# Patient Record
Sex: MALE | Race: WHITE
[De-identification: names, ages, dates, MRNs, and addresses within clinical notes are randomized per-mention and may not be internally consistent; named-entity substitution may affect disease eponyms.]

---

## 2018-01-05 ENCOUNTER — HOSPITAL ENCOUNTER (EMERGENCY)
Dept: HOSPITAL 41 - JD.ED | Age: 38
Discharge: HOME | End: 2018-01-05
Payer: COMMERCIAL

## 2018-01-05 DIAGNOSIS — Z88.1: ICD-10-CM

## 2018-01-05 DIAGNOSIS — F17.210: ICD-10-CM

## 2018-01-05 DIAGNOSIS — R09.1: Primary | ICD-10-CM

## 2018-01-05 PROCEDURE — 86140 C-REACTIVE PROTEIN: CPT

## 2018-01-05 PROCEDURE — 85025 COMPLETE CBC W/AUTO DIFF WBC: CPT

## 2018-01-05 PROCEDURE — 84484 ASSAY OF TROPONIN QUANT: CPT

## 2018-01-05 PROCEDURE — 71046 X-RAY EXAM CHEST 2 VIEWS: CPT

## 2018-01-05 PROCEDURE — 36415 COLL VENOUS BLD VENIPUNCTURE: CPT

## 2018-01-05 PROCEDURE — 85379 FIBRIN DEGRADATION QUANT: CPT

## 2018-01-05 PROCEDURE — 99284 EMERGENCY DEPT VISIT MOD MDM: CPT

## 2018-01-05 PROCEDURE — 83690 ASSAY OF LIPASE: CPT

## 2018-01-05 PROCEDURE — 80053 COMPREHEN METABOLIC PANEL: CPT

## 2018-01-05 PROCEDURE — 93005 ELECTROCARDIOGRAM TRACING: CPT

## 2018-01-05 NOTE — EDM.PDOC
ED HPI GENERAL MEDICAL PROBLEM





- General


Chief Complaint: Respiratory Problem


Stated Complaint: SHARP PAIN LOWER LT LUNG


Time Seen by Provider: 01/05/18 17:42


Source of Information: Reports: Patient


History Limitations: Reports: No Limitations





- History of Present Illness


INITIAL COMMENTS - FREE TEXT/NARRATIVE: 





37 -year-old male presents for evaluation treatment of left-sided chest pain. 

Patient reports he' started experiencing the chest pain on Wednesday. He states 

at first he thought it was heartburn and took some over-the-counter medication 

to help with the pain. He states that his worsen today. Prior to today it was 

intermittent but is now constant. He states that is worse with deep breathing. 

He reports it as a sharp pain and states is a 4 or 5 out of 10. He states it 

awoke him from sleep this morning around 0300. He states that he is not feel 

short of breath. He has had a slight cough. No nausea, vomiting, fevers, chills

, lightheadedness, dizziness, syncope or pain in the legs. Reports he has had a 

slight cough. 





He would denies any recent trauma to the chest.





No recent travel. 


Duration: Day(s): (2)


Treatments PTA: Reports: NSAIDS


  ** Left Chest


Pain Score (Numeric/FACES): 3





- Related Data


 Allergies











Allergy/AdvReac Type Severity Reaction Status Date / Time


 


cephalexin Allergy  Rash Verified 01/05/18 17:30











Home Meds: 


 Home Meds





. [No Known Home Meds]  01/05/18 [History]











Past Medical History


HEENT History: Reports: Impaired Vision


Other HEENT History: wears glasses





- Past Surgical History


Male  Surgical History: Reports: Vasectomy





Social & Family History





- Tobacco Use


Smoking Status *Q: Current Every Day Smoker


Years of Tobacco use: 15


Packs/Tins Daily: 1





- Caffeine Use


Caffeine Use: Reports: Soda





- Recreational Drug Use


Recreational Drug Use: No





ED ROS GENERAL





- Review of Systems


Review Of Systems: See Below


Constitutional: Denies: Fever, Chills


Respiratory: Reports: Pleuritic Chest Pain, Cough.  Denies: Shortness of Breath


Cardiovascular: Reports: Chest Pain


GI/Abdominal: Denies: Abdominal Pain, Nausea, Vomiting


Musculoskeletal: Denies: Leg Pain


Neurological: Denies: Syncope





ED EXAM, GENERAL





- Physical Exam


Exam: See Below


Exam Limited By: No Limitations


General Appearance: Alert, WD/WN, No Apparent Distress


Ears: Normal External Exam


Nose: Normal Inspection


Throat/Mouth: Normal Inspection, Normal Lips, Normal Voice, No Airway Compromise


Respiratory/Chest: No Respiratory Distress, Lungs Clear, Normal Breath Sounds


Cardiovascular: Normal Peripheral Pulses, Regular Rate, Rhythm, No Murmur


GI/Abdominal: Normal Bowel Sounds, Soft, Non-Tender


Neurological: Alert, Oriented, Normal Cognition


Psychiatric: Normal Affect, Normal Mood


Skin Exam: Warm, Dry, Normal Color





EKG INTERPRETATION


EKG Date: 01/05/18


Time: 18:30


Rhythm: NSR


Rate (Beats/Min): 65


Axis: Normal


P-Wave: Present


QRS: Normal


ST-T: Normal


QT: Normal


EKG Interpretation Comments: 





NSR at 65 bpm. No acute ST-T wave changes. Reviewed by myself and Dr. Lo. 





Course





- Vital Signs


Last Recorded V/S: 


 Last Vital Signs











Temp  36.4 C   01/05/18 17:31


 


Pulse  82   01/05/18 17:31


 


Resp  18   01/05/18 17:31


 


BP  135/89   01/05/18 17:31


 


Pulse Ox  98   01/05/18 17:31














- Orders/Labs/Meds


Orders: 


 Active Orders 24 hr











 Category Date Time Status


 


 Cardiac Monitoring [RC] .AS DIRECTED Care  01/05/18 17:53 Active


 


 EKG Documentation Completion [RC] ASDIRECTED Care  01/05/18 17:54 Active


 


 Peripheral IV Care [RC] .AS DIRECTED Care  01/05/18 17:54 Active


 


 Chest 2V [CR] Stat Exams  01/05/18 17:53 Taken


 


 Peripheral IV Insertion Adult [OM.PC] Routine Oth  01/05/18 17:53 Ordered


 


 EKG 12 Lead [EK] Stat Ther  01/05/18 17:53 Ordered











Labs: 


 Laboratory Tests











  01/05/18 01/05/18 01/05/18 Range/Units





  18:20 18:20 18:20 


 


WBC   8.92   (4.23-9.07)  K/mm3


 


RBC   5.73   (4.63-6.08)  M/mm3


 


Hgb   16.9   (13.7-17.5)  gm/L


 


Hct   50.3   (40.1-51.0)  %


 


MCV   87.8   (79.0-92.2)  fl


 


MCH   29.5   (25.7-32.2)  pg


 


MCHC   33.6   (32.2-35.5)  g/dl


 


RDW Std Deviation   42.0   (35.1-43.9)  fL


 


Plt Count   222   (163-337)  K/mm3


 


MPV   10.2   (9.4-12.3)  fl


 


Neutrophils % (Manual)   61 H   (40-60)  %


 


Band Neutrophils %   0   (0-10)  %


 


Lymphocytes % (Manual)   31   (20-40)  %


 


Atypical Lymphs %   0   %


 


Monocytes % (Manual)   6   (2-10)  %


 


Eosinophils % (Manual)   2   (0.8-7.0)  %


 


Basophils % (Manual)   0 L   (0.2-1.2)  


 


Platelet Estimate   Adequate   


 


RBC Morph Comment   Normal   


 


D-Dimer, Quantitative    0.55  (0.19-0.59)  mg/L


 


Sodium  140    (136-145)  mEq/L


 


Potassium  3.6    (3.5-5.1)  mEq/L


 


Chloride  106    ()  mEq/L


 


Carbon Dioxide  25    (21-32)  mEq/L


 


Anion Gap  12.6    (5-15)  


 


BUN  19 H    (7-18)  mg/dL


 


Creatinine  1.1    (0.7-1.3)  mg/dL


 


Est Cr Clr Drug Dosing  99.83    mL/min


 


Estimated GFR (MDRD)  > 60    (>60)  mL/min


 


BUN/Creatinine Ratio  17.3    (14-18)  


 


Glucose  94    ()  mg/dL


 


Calcium  8.9    (8.5-10.1)  mg/dL


 


Total Bilirubin  0.3    (0.2-1.0)  mg/dL


 


AST  17    (15-37)  U/L


 


ALT  42    (16-63)  U/L


 


Alkaline Phosphatase  110    ()  U/L


 


Troponin I  < 0.017    (0.00-0.056)  ng/mL


 


C-Reactive Protein  < 0.2    (<1.0)  mg/dL


 


Total Protein  7.6    (6.4-8.2)  g/dl


 


Albumin  4.3    (3.4-5.0)  g/dl


 


Globulin  3.3    gm/dL


 


Albumin/Globulin Ratio  1.3    (1-2)  


 


Lipase  329    ()  U/L











Meds: 


Medications














Discontinued Medications














Generic Name Dose Route Start Last Admin





  Trade Name Freq  PRN Reason Stop Dose Admin


 


Sodium Chloride  10 ml  01/05/18 17:53  01/05/18 18:19





  Saline Flush  FLUSH   10 ml





  ASDIRECTED PRN   Administration





  Keep Vein Open   














- Radiology Interpretation


Free Text/Narrative:: 





chest xray shows no acute intrathoracic process. 





- Re-Assessments/Exams


Free Text/Narrative Re-Assessment/Exam: 





01/05/18 19:40


I reviewed the EKG, labs and chest x-ray results with the patient.





I feel he likely has pleurisy. Recommend Tylenol, Motrin and follow-up if not 

much better next week.





Discharge instructions as documented.











Departure





- Departure


Time of Disposition: 19:41


Disposition: Home, Self-Care 01


Condition: Fair


Clinical Impression: 


 Pleurisy








- Discharge Information


Instructions:  Pleurisy, Easy-to-Read


Referrals: 


PCP,None [Primary Care Provider] - 


Forms:  ED Department Discharge


Additional Instructions: 


Over-the-counter Tylenol or Motrin as needed for pain relief.





make sure you are drinking plenty of fluids.





Follow-up with family medicine if your symptoms have not improved in one week. 

Recommend Dr. Allred or Percy Leslie at the [facility]. Call [phone] to schedule with one of them. 





Please return to the ER if your symptoms change or worsen.





- My Orders


Last 24 Hours: 


My Active Orders





01/05/18 17:53


Cardiac Monitoring [RC] .AS DIRECTED 


Chest 2V [CR] Stat 


Peripheral IV Insertion Adult [OM.PC] Routine 


EKG 12 Lead [EK] Stat 





01/05/18 17:54


EKG Documentation Completion [RC] ASDIRECTED 


Peripheral IV Care [RC] .AS DIRECTED 














- Assessment/Plan


Last 24 Hours: 


My Active Orders





01/05/18 17:53


Cardiac Monitoring [RC] .AS DIRECTED 


Chest 2V [CR] Stat 


Peripheral IV Insertion Adult [OM.PC] Routine 


EKG 12 Lead [EK] Stat 





01/05/18 17:54


EKG Documentation Completion [RC] ASDIRECTED 


Peripheral IV Care [RC] .AS DIRECTED

## 2018-01-06 NOTE — CR
Chest: Two views of the chest were obtained.

 

Comparison: No prior chest x-ray.

 

Heart size and mediastinum are within normal limits.  Lungs are clear 

but hyperinflated.  Bony structures are unremarkable.

 

Impression:

1.  Hyperinflated lungs.  Please correlate if patient has history of 

smoking or chronic asthma.

2.  Nothing acute is otherwise seen on two-view chest x-ray. 

 

Diagnostic code #2

## 2018-03-11 ENCOUNTER — HOSPITAL ENCOUNTER (EMERGENCY)
Dept: HOSPITAL 41 - JD.ED | Age: 38
LOS: 1 days | Discharge: HOME | End: 2018-03-12
Payer: COMMERCIAL

## 2018-03-11 DIAGNOSIS — K29.30: Primary | ICD-10-CM

## 2018-03-11 DIAGNOSIS — F17.210: ICD-10-CM

## 2018-03-11 DIAGNOSIS — Z88.1: ICD-10-CM

## 2018-03-11 PROCEDURE — 83690 ASSAY OF LIPASE: CPT

## 2018-03-11 PROCEDURE — 74177 CT ABD & PELVIS W/CONTRAST: CPT

## 2018-03-11 PROCEDURE — 86140 C-REACTIVE PROTEIN: CPT

## 2018-03-11 PROCEDURE — 86677 HELICOBACTER PYLORI ANTIBODY: CPT

## 2018-03-11 PROCEDURE — 74018 RADEX ABDOMEN 1 VIEW: CPT

## 2018-03-11 PROCEDURE — 85025 COMPLETE CBC W/AUTO DIFF WBC: CPT

## 2018-03-11 PROCEDURE — 36415 COLL VENOUS BLD VENIPUNCTURE: CPT

## 2018-03-11 PROCEDURE — 80053 COMPREHEN METABOLIC PANEL: CPT

## 2018-03-11 PROCEDURE — 99284 EMERGENCY DEPT VISIT MOD MDM: CPT

## 2018-03-11 NOTE — EDM.PDOC
ED HPI GENERAL MEDICAL PROBLEM





- General


Chief Complaint: Abdominal Pain


Stated Complaint: ABDOMINAL PAIN


Time Seen by Provider: 03/11/18 22:56


Source of Information: Reports: Patient


History Limitations: Reports: No Limitations





- History of Present Illness


INITIAL COMMENTS - FREE TEXT/NARRATIVE: 


38-year-old male presents to the ED with persistent left upper quadrant 

abdominal pain that radiates slightly into the left flank for the last 2 days. 

States it started on Friday evening after supper. Eating makes this pain worse 

as well. Associated nausea without vomiting. Bowels have been moving he thinks 

perhaps a little bit more than usual he's pressure that everything he ate today 

is gone right through him. Is not no cine blood in the stool. He will drinks 

alcohol socially but not much recently. He is a cigarette smoker of a pack per 

day. Has a cough. Deep breathing does not make the pain worse. No radiation of 

the pain into the groin to suggest renal colic. Pain is constant but does have 

a colicky component. It is felt along the left costal margin. Patient states he'

s never required Rolaids or Tums for gastroesophageal reflux disease. He doesn'

t know what heartburn really is.





Onset: Gradual


Onset Date: 03/09/18


Onset Time: 21:00


Duration: Day(s):, Getting Worse, Waxing/Waning


Location: Reports: Abdomen (Constant pain with a strong colicky component left 

upper quadrant of the abdomen)


Quality: Reports: Ache, Other (Colicky pain as well.)


Severity: Moderate


Improves with: Reports: None (Rates it as 6 or 7 out of 10)


Worsens with: Reports: Eating


Context: Denies: Activity, Exercise, Lifting, Sick Contact, Trauma, Other


Associated Symptoms: Reports: Cough, cough w sputum (Smoker's cough.), Loss of 

Appetite, Nausea/Vomiting.  Denies: No Other Symptoms, Confusion, Chest Pain ( 

Occasional brownish sputum production), Diaphoresis, Fever/Chills, Headaches, 

Malaise, Rash (As he was no vomiting), Seizure, Shortness of Breath, Weakness


Treatments PTA: Reports: Other (see below) (9.)


  ** Left Upper Abdomen


Pain Score (Numeric/FACES): 4





- Related Data


 Allergies











Allergy/AdvReac Type Severity Reaction Status Date / Time


 


cephalexin Allergy  Rash Verified 03/11/18 22:03











Home Meds: 


 Home Meds





Famotidine [Pepcid AC] 20 mg PO BID #10 tablet 03/12/18 [Rx]











Past Medical History


HEENT History: Reports: Impaired Vision


Other HEENT History: wears glasses





- Past Surgical History


Male  Surgical History: Reports: Vasectomy





Social & Family History





- Tobacco Use


Smoking Status *Q: Current Every Day Smoker


Years of Tobacco use: 15


Packs/Tins Daily: 1





- Caffeine Use


Caffeine Use: Reports: Soda





- Recreational Drug Use


Recreational Drug Use: No





- Living Situation & Occupation


Living situation: Reports: Single


Occupation: Employed





ED ROS GENERAL





- Review of Systems


Review Of Systems: See Below


Constitutional: Reports: Weakness, Decreased Appetite.  Denies: Fever, Chills, 

Malaise


HEENT: Reports: No Symptoms


Respiratory: Reports: Cough.  Denies: Wheezing, Pleuritic Chest Pain


Cardiovascular: Denies: Chest Pain (Productive sounding cough. He is a pack-a-

day cigarette smoker.), Blood Pressure Problem, Claudication, Dyspnea on 

Exertion, Edema, Lightheadedness, Orthopnea, Palpitations, PND


Endocrine: Reports: Fatigue


GI/Abdominal: Reports: Abdominal Pain, Diarrhea (See history of present illness 

as on the looser side but still semi-formed without blood.), Decreased Appetite 

(Eating makes the pain worse.)


: Reports: No Symptoms


Musculoskeletal: Reports: No Symptoms


Skin: Reports: No Symptoms


Neurological: Reports: No Symptoms


Psychiatric: Reports: No Symptoms


Hematologic/Lymphatic: Reports: No Symptoms


Immunologic: Reports: No Symptoms





ED EXAM, GI/ABD





- Physical Exam


Exam: See Below


Exam Limited By: No Limitations


General Appearance: Alert, WD/WN, No Apparent Distress, Other


Eyes: Bilateral: Normal Appearance (No atrial jaundice. Jaundice.)


Neck: Normal Inspection, Supple, Non-Tender, Full Range of Motion.  No: Carotid 

Bruit, Lymphadenopathy (L), Lymphadenopathy (R)


Respiratory/Chest: No Respiratory Distress, Lungs Clear, Normal Breath Sounds, 

Chest Non-Tender, Other (Rhonchi left base that cleared with coughing.)


Cardiovascular: Normal Peripheral Pulses, Regular Rate, Rhythm, No Edema, No 

Gallop, No Murmur


GI/Abdominal Exam: Normal Bowel Sounds, No Organomegaly ( Slightly distended to 

activity to percussion throughout the epigastrium and left upper quadrant of 

the abdomen.), No Abnormal Bruit, No Mass, Pelvis Stable, Distended (Bowel 

sounds are hyperactive in all 4 quadrants.), Tender, Abnormal Bowel Sounds, 

Other (No abdominal surgical scars).  No: Guarding, Rigid, Rebound (Mild 

tenderness left upper quadrant)


 (Male) Exam: No Hernia


Back Exam: Normal Inspection, Full Range of Motion.  No: CVA Tenderness (L), 

CVA Tenderness (R)


Extremities: Normal Inspection, Normal Range of Motion, Non-Tender, No Pedal 

Edema, Normal Capillary Refill


Neurological: Oriented, CN II-XII Intact, Normal Cognition


Psychiatric: Normal Affect, Normal Mood


Skin Exam: Warm, Dry, Intact, Normal Color, No Rash





Course





- Vital Signs


Last Recorded V/S: 


 Last Vital Signs











Temp  36.3 C   03/11/18 22:04


 


Pulse  75   03/11/18 22:04


 


Resp  18   03/11/18 22:04


 


BP  124/94 H  03/11/18 22:04


 


Pulse Ox  98   03/11/18 22:04














- Orders/Labs/Meds


Orders: 


 Active Orders 24 hr











 Category Date Time Status


 


 Abdomen 1V Flat [CR] Stat Exams  03/11/18 22:55 Taken


 


 Abdomen Pelvis w Cont [CT] Stat Exams  03/11/18 23:41 Taken











Labs: 


 Laboratory Tests











  03/11/18 03/11/18 03/11/18 Range/Units





  22:26 22:26 22:26 


 


WBC  10.37 H    (4.23-9.07)  K/mm3


 


RBC  5.30    (4.63-6.08)  M/mm3


 


Hgb  16.4    (13.7-17.5)  gm/L


 


Hct  48.4    (40.1-51.0)  %


 


MCV  91.3    (79.0-92.2)  fl


 


MCH  30.9    (25.7-32.2)  pg


 


MCHC  33.9    (32.2-35.5)  g/dl


 


RDW Std Deviation  44.2 H    (35.1-43.9)  fL


 


Plt Count  217    (163-337)  K/mm3


 


MPV  11.0    (9.4-12.3)  fl


 


Neutrophils % (Manual)  74 H    (40-60)  %


 


Band Neutrophils %  0    (0-10)  %


 


Lymphocytes % (Manual)  20    (20-40)  %


 


Atypical Lymphs %  0    %


 


Monocytes % (Manual)  3    (2-10)  %


 


Eosinophils % (Manual)  2    (0.8-7.0)  %


 


Basophils % (Manual)  1    (0.2-1.2)  


 


Platelet Estimate  Adequate    


 


Plt Morphology Comment  Normal    


 


RBC Morph Comment  Normal    


 


Sodium   141   (136-145)  mEq/L


 


Potassium   3.7   (3.5-5.1)  mEq/L


 


Chloride   106   ()  mEq/L


 


Carbon Dioxide   26   (21-32)  mEq/L


 


Anion Gap   12.7   (5-15)  


 


BUN   18   (7-18)  mg/dL


 


Creatinine   1.1   (0.7-1.3)  mg/dL


 


Est Cr Clr Drug Dosing   98.88   mL/min


 


Estimated GFR (MDRD)   > 60   (>60)  mL/min


 


BUN/Creatinine Ratio   16.4   (14-18)  


 


Glucose   116 H   ()  mg/dL


 


Calcium   8.3 L   (8.5-10.1)  mg/dL


 


Total Bilirubin   0.4   (0.2-1.0)  mg/dL


 


AST   TNP   


 


ALT   TNP   


 


Alkaline Phosphatase   105   ()  U/L


 


C-Reactive Protein   < 0.2   (<1.0)  mg/dL


 


Total Protein   6.6   (6.4-8.2)  g/dl


 


Albumin   3.7   (3.4-5.0)  g/dl


 


Globulin   2.9   gm/dL


 


Albumin/Globulin Ratio   1.3   (1-2)  


 


Lipase   249   ()  U/L


 


H. pylori IgG Antibody    Negative  (NEGATIVE)  











Meds: 


Medications














Discontinued Medications














Generic Name Dose Route Start Last Admin





  Trade Name Freq  PRN Reason Stop Dose Admin


 


Diatrizoate Meglum/Diatrizoate Sod  90 ml  03/12/18 00:56  03/12/18 01:12





  Gastrografin 37%  PO  03/12/18 00:57  90 ml





  ONETIME ONE   Administration


 


Dextrose/Sodium Chloride  1,000 mls @ 999 mls/hr  03/11/18 23:00  





  Dextrose 5%-Normal Saline  IV   





  ASDIRECTED MARIANGEL   


 


Iopamidol  125 ml  03/12/18 00:56  03/12/18 01:11





  Isovue-300 (61%)  IVPUSH  03/12/18 00:57  125 ml





  ONETIME ONE   Administration


 


Meperidine HCl  50 mg  03/11/18 22:54  





  Demerol  IV  03/11/18 22:55  





  ONETIME ONE   


 


Meperidine HCl  Confirm  03/11/18 23:04  03/11/18 23:11





  Demerol  Administered  03/11/18 23:05  Not Given





  Dose   





  50 mg   





  .ROUTE   





  .STK-MED ONE   


 


Meperidine HCl  50 mg  03/11/18 23:10  





  Demerol  IVPUSH  03/11/18 23:11  





  ONETIME ONE   


 


Metoclopramide HCl  7.5 mg  03/11/18 22:55  





  Reglan  IVPUSH  03/11/18 22:56  





  ONETIME ONE   


 


Sodium Chloride  10 ml  03/12/18 00:56  03/12/18 01:12





  Saline Flush  FLUSH  03/12/18 00:57  10 ml





  ONETIME ONE   Administration














- Radiology Interpretation


Free Text/Narrative:: 





38-year-old male presents the ED with persistent left upper quadrant abdominal 

pain 2 days. States it's constant ache that worsens intermittently i.e. 

colicky component to the pain. Is definitely worsened by eating. Associated 

nausea without any vomiting. Stools have been on the looser side but still 

formed up without blood. No previous abdominal surgery. Does not drink alcohol 

much at all and nothing heavy recently. No history of pancreatitis. Examination 

reveals benign chest and benign abdominal examination with active bowel sounds 

in all 4 quadrants. Plan IV D5 normal saline at open. Demerol 50 Milligan grams 

IV for pain relief with Reglan 7.5 mg IV for nausea relief. Routine labs to 

include lipase to be collected with a CRP. One view of the abdomen to be 

obtained.





- Re-Assessments/Exams


Free Text/Narrative Re-Assessment/Exam: 





03/11/18 23:40  Lab work shows a white count of 10.37 with differential 

pending. Hemoglobin is slightly elevated at 16.4 hematocrit of 48.4 suggesting 

some degree of hemoconcentration. Will count is 217,000. Sodium is 141 with a 

potassium of 3.7 chloride 106 bicarbonate 26. And a gap is 12.7 BUNs 18. 

Creatinine is 1.1. Glucose is 116 with calcium of 8.3. Bilirubin is 0.4 AST and 

ALT are pending. Alk phosphatase is normal at 105 C-reactive protein is less 

than 0.2 total protein 6.6 lipase is normal at 249 H. pylori was negative. KUB 

reveals no abnormalities. There is a normal gas pattern in the colon. No 

obvious masses or abnormalities detected in the left upper quadrant.. Plan : We'

ll proceed with CT of the abdomen and pelvis with oral and IV contrast. Nurse 

tells me now that the patient refused all of his initial medications that I had 

prescribed as he wasn't feeling too bad at that time. Going to proceed with CT 

of the abdomen with oral and IV contrast and therefore will be started.








03/12/18 01:27  CT of the abdomen and pelvis has returned. It reveals no 

abnormalities. Liver stomach pancreas and spleen all appear to be within normal 

limits. Kidneys appear normal with no sign of obstruction of the ureters. Bowel 

pattern appears to be normal without increased stool in minimal diverticula of 

her and in the left lower colon without any signs of diverticulitis. Bladder 

fills nicely on delayed films. Patient must have a form of gastritis or 

inflammation of the stomach to have persistent left upper quadrant abdominal 

pain. There certainly no signs of pancreatitis on his blood work.








Departure





- Departure


Time of Disposition: 01:52


Disposition: Home, Self-Care 01


Condition: Fair


Clinical Impression: 


 Superficial nonerosive nonspecific gastritis





Abdominal pain


Qualifiers:


 Abdominal location: left upper quadrant Qualified Code(s): R10.12 - Left upper 

quadrant pain








- Discharge Information


Prescriptions: 


Famotidine [Pepcid AC] 20 mg PO BID #10 tablet


Instructions:  Gastritis, Adult, Easy-to-Read, Abdominal Pain, Adult, Easy-to-

Read


Referrals: 


PCP,None [Primary Care Provider] - 


Forms:  ED Department Discharge


Additional Instructions: 


Evaluation the emergency room tonight in regards to persistent discomfort in 

the left upper quadrant of your abdomen associate with intermittent mild 

nausea. No vomiting. No recent use of anti-inflammatory agents or aspirin to 

irritate stomach. Alcohol use has been kept to a bare minimum the last several 

weeks. As you indicated with to eat seem to travel through you very quickly 

suggesting irritation of the upper GI tract lab work done did not shed any 

light on the potential source of problems particularly no signs of pancreatitis 

which lives in the left upper quadrant of the abdomen and the organism that 

causes ulcers of the stomach called H. pylori came back negative indicating you 

never been exposed to this bacterial infection. Markers for inflammation and 

infection also proved to be negative. X-ray of the abdomen also did not show 

any abnormalities of the visualized portions of the gut. CT scan of the adipose 

was therefore performed. It to essentially is normal with no pathology 

identified in the liver or spleen kidneys ureters large bowel or small bowel. 

Also the bladder filled normally with contrast. Images suggest air and slight 

thickening of the wall of the stomach suggesting a mild nonspecific gastritis. 

It did not show any definite ulcerations. It appears that her illness is most 

likely viral with inflammation of the stomach and GI tract. I would suggest use 

of Pepcid 20 mg twice daily breakfast and bedtime for the next 5 days to set 

down the acid in your stomach and allow the stomach lining to heal. Right all 

spicy foods no alcohol and no acidic juices such as apple juice or orange juice 

for a few days. Keeping the diet fairly bland for the next couple days is 

advised. Expect things to improve over the next 48-72 hours. If pain worsens in 

anyway return to medical care.





- My Orders


Last 24 Hours: 


My Active Orders





03/11/18 22:55


Abdomen 1V Flat [CR] Stat 





03/11/18 23:41


Abdomen Pelvis w Cont [CT] Stat 














- Assessment/Plan


Last 24 Hours: 


My Active Orders





03/11/18 22:55


Abdomen 1V Flat [CR] Stat 





03/11/18 23:41


Abdomen Pelvis w Cont [CT] Stat

## 2018-03-12 NOTE — CT
CT abdomen and pelvis

 

Technique: Multiple axial sections were obtained from above the dome 

of the diaphragm inferiorly through the pubic symphysis.  Intravenous 

and oral contrast was utilized.  Delayed images were also obtained 

through the bladder.

 

Comparison: No prior CT exam.

 

Findings: Visualized lung bases show nothing acute.

 

Liver contains no focal abnormality.  Spleen appears within normal 

limits.  Adrenal glands contain no nodules.  Kidneys show symmetric 

contrast enhancement without hydronephrosis or mass.  Pancreas appears

 within normal limits.  Aorta shows no aneurysmal dilatation.  

Appendix is seen which appears normal.  No retroperitoneal adenopathy 

or mesenteric abnormalities are seen.  No pelvic mass or adenopathy is

 seen.  No free fluid or inflammatory change is seen.

 

No bowel dilatation or bowel wall thickening is seen.  Delayed images 

show contrast within the distal ureters and within the bladder.

 

Impression:

1.  No abnormality is identified on CT study of the abdomen and 

pelvis.

 

Diagnostic code #1

 

I agree with preliminary report from [facility], finalized at 03/12/18, 2:24

 AM Central Time

## 2018-03-12 NOTE — CR
Abdomen: Supine view of the abdomen was obtained.  

 

Comparison: No prior abdominal x-ray.

 

Bowel gas pattern appears normal.  No abnormal calcifications or soft 

tissue abnormality is seen.  Bony structures appear within normal 

limits.

 

Impression:

1.  No abnormality is identified on supine abdominal x-ray.

 

Diagnostic code #1